# Patient Record
Sex: FEMALE | Race: WHITE
[De-identification: names, ages, dates, MRNs, and addresses within clinical notes are randomized per-mention and may not be internally consistent; named-entity substitution may affect disease eponyms.]

---

## 2020-11-25 ENCOUNTER — HOSPITAL ENCOUNTER (INPATIENT)
Dept: HOSPITAL 95 - ER | Age: 55
LOS: 4 days | Discharge: NURSING, REHAB OR LONG-TERM CARE | DRG: 897 | End: 2020-11-29
Attending: HOSPITALIST | Admitting: HOSPITALIST
Payer: COMMERCIAL

## 2020-11-25 VITALS — BODY MASS INDEX: 34.21 KG/M2 | HEIGHT: 67.99 IN | WEIGHT: 225.75 LBS

## 2020-11-25 DIAGNOSIS — I10: ICD-10-CM

## 2020-11-25 DIAGNOSIS — E66.9: ICD-10-CM
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
## 2020-11-25 DIAGNOSIS — E87.6: ICD-10-CM

## 2020-11-25 DIAGNOSIS — Z20.828: ICD-10-CM

## 2020-11-25 DIAGNOSIS — Z98.84: ICD-10-CM

## 2020-11-25 DIAGNOSIS — Z23: ICD-10-CM

## 2020-11-25 DIAGNOSIS — K22.10: ICD-10-CM

## 2020-11-25 DIAGNOSIS — D61.818: ICD-10-CM

## 2020-11-25 DIAGNOSIS — D62: ICD-10-CM

## 2020-11-25 DIAGNOSIS — D69.6: ICD-10-CM

## 2020-11-25 DIAGNOSIS — K63.5: ICD-10-CM

## 2020-11-25 DIAGNOSIS — F10.231: Primary | ICD-10-CM

## 2020-11-25 DIAGNOSIS — F32.9: ICD-10-CM

## 2020-11-25 DIAGNOSIS — K21.9: ICD-10-CM

## 2020-11-25 DIAGNOSIS — K70.30: ICD-10-CM

## 2020-11-25 LAB
ALBUMIN SERPL BCP-MCNC: 3 G/DL (ref 3.4–5)
ALBUMIN/GLOB SERPL: 0.8 {RATIO} (ref 0.8–1.8)
ALT SERPL W P-5'-P-CCNC: 48 U/L (ref 12–78)
ANION GAP SERPL CALCULATED.4IONS-SCNC: 8 MMOL/L (ref 6–16)
AST SERPL W P-5'-P-CCNC: 120 U/L (ref 12–37)
BASOPHILS # BLD AUTO: 0.04 K/MM3 (ref 0–0.23)
BASOPHILS NFR BLD AUTO: 1 % (ref 0–2)
BILIRUB SERPL-MCNC: 1.4 MG/DL (ref 0.1–1)
BUN SERPL-MCNC: 10 MG/DL (ref 8–24)
CALCIUM SERPL-MCNC: 8.5 MG/DL (ref 8.5–10.1)
CHLORIDE SERPL-SCNC: 109 MMOL/L (ref 98–108)
CO2 SERPL-SCNC: 26 MMOL/L (ref 21–32)
CREAT SERPL-MCNC: 0.7 MG/DL (ref 0.4–1)
DEPRECATED RDW RBC AUTO: 63.7 FL (ref 35.1–46.3)
EOSINOPHIL # BLD AUTO: 0.04 K/MM3 (ref 0–0.68)
EOSINOPHIL NFR BLD AUTO: 1 % (ref 0–6)
ERYTHROCYTE [DISTWIDTH] IN BLOOD BY AUTOMATED COUNT: 18.3 % (ref 11.7–14.2)
GLOBULIN SER CALC-MCNC: 4 G/DL (ref 2.2–4)
GLUCOSE SERPL-MCNC: 99 MG/DL (ref 70–99)
HCT VFR BLD AUTO: 28.5 % (ref 33–51)
HGB BLD-MCNC: 8.4 G/DL (ref 11.5–16)
IMM GRANULOCYTES # BLD AUTO: 0.02 K/MM3 (ref 0–0.1)
IMM GRANULOCYTES NFR BLD AUTO: 0 % (ref 0–1)
LYMPHOCYTES # BLD AUTO: 0.66 K/MM3 (ref 0.84–5.2)
LYMPHOCYTES NFR BLD AUTO: 14 % (ref 21–46)
MCHC RBC AUTO-ENTMCNC: 29.5 G/DL (ref 31.5–36.5)
MCV RBC AUTO: 96 FL (ref 80–100)
MONOCYTES # BLD AUTO: 0.39 K/MM3 (ref 0.16–1.47)
MONOCYTES NFR BLD AUTO: 8 % (ref 4–13)
NEUTROPHILS # BLD AUTO: 3.63 K/MM3 (ref 1.96–9.15)
NEUTROPHILS NFR BLD AUTO: 76 % (ref 41–73)
NRBC # BLD AUTO: 0 K/MM3 (ref 0–0.02)
NRBC BLD AUTO-RTO: 0 /100 WBC (ref 0–0.2)
PLATELET # BLD AUTO: 33 K/MM3 (ref 150–400)
POTASSIUM SERPL-SCNC: 3.9 MMOL/L (ref 3.5–5.5)
PROT SERPL-MCNC: 7 G/DL (ref 6.4–8.2)
PROTHROMBIN TIME: 11.4 SEC (ref 9.7–11.5)
SODIUM SERPL-SCNC: 143 MMOL/L (ref 136–145)

## 2020-11-25 PROCEDURE — C9113 INJ PANTOPRAZOLE SODIUM, VIA: HCPCS

## 2020-11-25 PROCEDURE — G0480 DRUG TEST DEF 1-7 CLASSES: HCPCS

## 2020-11-25 PROCEDURE — A9270 NON-COVERED ITEM OR SERVICE: HCPCS

## 2020-11-26 LAB
ALBUMIN SERPL BCP-MCNC: 2.6 G/DL (ref 3.4–5)
ALBUMIN/GLOB SERPL: 0.8 {RATIO} (ref 0.8–1.8)
ALT SERPL W P-5'-P-CCNC: 39 U/L (ref 12–78)
ANION GAP SERPL CALCULATED.4IONS-SCNC: 5 MMOL/L (ref 6–16)
AST SERPL W P-5'-P-CCNC: 98 U/L (ref 12–37)
BASOPHILS # BLD AUTO: 0.02 K/MM3 (ref 0–0.23)
BASOPHILS # BLD AUTO: 0.04 K/MM3 (ref 0–0.23)
BASOPHILS NFR BLD AUTO: 1 % (ref 0–2)
BASOPHILS NFR BLD AUTO: 1 % (ref 0–2)
BENZODIAZ UR-MCNC: DETECTED UG/L
BILIRUB SERPL-MCNC: 1.5 MG/DL (ref 0.1–1)
BUN SERPL-MCNC: 10 MG/DL (ref 8–24)
CALCIUM SERPL-MCNC: 7.9 MG/DL (ref 8.5–10.1)
CHLORIDE SERPL-SCNC: 109 MMOL/L (ref 98–108)
CO2 SERPL-SCNC: 28 MMOL/L (ref 21–32)
CREAT SERPL-MCNC: 0.62 MG/DL (ref 0.4–1)
DEPRECATED RDW RBC AUTO: 63.1 FL (ref 35.1–46.3)
DEPRECATED RDW RBC AUTO: 63.7 FL (ref 35.1–46.3)
EOSINOPHIL # BLD AUTO: 0.05 K/MM3 (ref 0–0.68)
EOSINOPHIL # BLD AUTO: 0.05 K/MM3 (ref 0–0.68)
EOSINOPHIL NFR BLD AUTO: 2 % (ref 0–6)
EOSINOPHIL NFR BLD AUTO: 2 % (ref 0–6)
ERYTHROCYTE [DISTWIDTH] IN BLOOD BY AUTOMATED COUNT: 18 % (ref 11.7–14.2)
ERYTHROCYTE [DISTWIDTH] IN BLOOD BY AUTOMATED COUNT: 18.3 % (ref 11.7–14.2)
FERRITIN SERPL-MCNC: 38 NG/ML (ref 8–252)
GLOBULIN SER CALC-MCNC: 3.4 G/DL (ref 2.2–4)
GLUCOSE SERPL-MCNC: 106 MG/DL (ref 70–99)
HCT VFR BLD AUTO: 24.4 % (ref 33–51)
HCT VFR BLD AUTO: 25.4 % (ref 33–51)
HGB BLD-MCNC: 7.2 G/DL (ref 11.5–16)
HGB BLD-MCNC: 7.6 G/DL (ref 11.5–16)
IMM GRANULOCYTES # BLD AUTO: 0 K/MM3 (ref 0–0.1)
IMM GRANULOCYTES # BLD AUTO: 0.01 K/MM3 (ref 0–0.1)
IMM GRANULOCYTES NFR BLD AUTO: 0 % (ref 0–1)
IMM GRANULOCYTES NFR BLD AUTO: 0 % (ref 0–1)
LYMPHOCYTES # BLD AUTO: 0.4 K/MM3 (ref 0.84–5.2)
LYMPHOCYTES # BLD AUTO: 0.81 K/MM3 (ref 0.84–5.2)
LYMPHOCYTES NFR BLD AUTO: 15 % (ref 21–46)
LYMPHOCYTES NFR BLD AUTO: 25 % (ref 21–46)
MCHC RBC AUTO-ENTMCNC: 29.5 G/DL (ref 31.5–36.5)
MCHC RBC AUTO-ENTMCNC: 29.9 G/DL (ref 31.5–36.5)
MCV RBC AUTO: 96 FL (ref 80–100)
MCV RBC AUTO: 96 FL (ref 80–100)
MONOCYTES # BLD AUTO: 0.24 K/MM3 (ref 0.16–1.47)
MONOCYTES # BLD AUTO: 0.31 K/MM3 (ref 0.16–1.47)
MONOCYTES NFR BLD AUTO: 10 % (ref 4–13)
MONOCYTES NFR BLD AUTO: 9 % (ref 4–13)
NEUTROPHILS # BLD AUTO: 2 K/MM3 (ref 1.96–9.15)
NEUTROPHILS # BLD AUTO: 2.03 K/MM3 (ref 1.96–9.15)
NEUTROPHILS NFR BLD AUTO: 62 % (ref 41–73)
NEUTROPHILS NFR BLD AUTO: 74 % (ref 41–73)
NRBC # BLD AUTO: 0 K/MM3 (ref 0–0.02)
NRBC # BLD AUTO: 0 K/MM3 (ref 0–0.02)
NRBC BLD AUTO-RTO: 0 /100 WBC (ref 0–0.2)
NRBC BLD AUTO-RTO: 0 /100 WBC (ref 0–0.2)
PLATELET # BLD AUTO: 29 K/MM3 (ref 150–400)
PLATELET # BLD AUTO: 29 K/MM3 (ref 150–400)
POTASSIUM SERPL-SCNC: 3.3 MMOL/L (ref 3.5–5.5)
PROT SERPL-MCNC: 6 G/DL (ref 6.4–8.2)
SODIUM SERPL-SCNC: 142 MMOL/L (ref 136–145)
TIBC SERPL-MCNC: 293 UG/DL (ref 250–450)

## 2020-11-26 NOTE — NUR
ADMIT
RECEIVED FROM ER VIA GURNEY.  ORIENTED TO SELF AND EVENT.  OCCASIONAL CONFUSED
CONVERSATION.  SLOW VERBAL RESPONSE NOTED.  REPOSITIONS SELF IN BED.  MOVES
ALL EXTREMITIES.  DENIES C/O PAIN OTHER THAN A MILD HEADACHE.  ALSO C/O
INTERMITTENT MILD NAUSEA.  IMPULSIVE BEHAVIOR NOTED.  BED ALARM ON.  MONITOR
SHOWS SR-ST, RATE 90s-100s.  2L NC.  RESPIRATIONS EVEN AND UNLABORED.  KANG
PATENT AND DRAINING LOU URINE.  BANANA BAG INFUSING AT 200CC/HR.  SEE ADMIT
ASSESSMENT FOR FULL ASSESSMENT.

## 2020-11-26 NOTE — NUR
ASSUMED CARE
PT A&O; SITTING IN BED; DENIES CHEST PAIN; VSS; HR 80'S; O2 SATS >93 ON
RA; POSEY VEST IN PLACE; KANG PATENT & DRAINING; CIWA 7; CALL LIGHT IN REACH;
BED IN LOWEST POSITION; BED ALARM ON.

## 2020-11-26 NOTE — NUR
CARE ASSUMED
ASSESSMENT COMPLETED. PT AWAKE BUT DROWSY UPON ASSESSMENT, WAS ABLE TO SIT UP
AND TAKE LIBRIUM WITHOUT DIFFICULTY. DENIES PAIN OR NAUSEA AT THIS TIME, IS
ORIENTED X4, IS ABLE TO RECALL EVENTS LEADING UP TO HOSPITALIZATION. PT
PLEASANT AND COOPERATIVE WITH CARE. CIWA SCORE 4, PRECEDEX 0.3MCG, WILL
ATTEMPT TO TITRATE DOWN AS ABLE. LS CLEAR, HRR, VSS. DR. ALLEN IN TO SEE
PATIENT, AWARE OF PLT AND HGB LEVELS, WILL RECHECK AS ORDERED.

## 2020-11-26 NOTE — NUR
TRANSFER NOTE
PT SLEPT MOST OF MORNING, VSS. DR. ALLEN AWARE OF LAB RESULTS, NEW ORDER FOR
GI CONSULT. PRECEDEX TITRATED OFF, PT WOKE UP AND ATE LUNCH, TOLERATED WELL.
DR. OH IN TO ASSESS, NEW ORDERS RECEIVED, NO PLANS FOR SCOPE AT THIS TIME,
PT ALLOWED TO EAT. PT UP TO BSC WITH MAX 2 PERSON ASSIST, HAD A LARGE BROWN
BM, NO OBVIOUS BLOOD IN STOOL. PT BACK TO BED FOR US, TOLERATED WELL, THEN SAT
UP AND ATE A SNACK, IS NOW RESTING IN BED WATCHING TV. PT REMAINS ORIENTED
BUT IMPULSIVE, AMADA REMAINS IN PLACE FOR PATIENT SAFETY. DAUGHTER UPDATED,
REPORTS PT HAS A HISTORY OF BLEEDING ULCERS WITH CAUTERIZATION IN 2017 AND
PARACENTESIS. REPORT TO BLADIMIR, PCU RN, PT TO PCU 1.

## 2020-11-26 NOTE — NUR
PT TRANSFERED FROM ICU TO PCU TODAY. NO ACUTE CHANGES. PT ALERT AND QUIET IN
ROOM, TOLERATING AMADA VEST WELL. PT ABLE TO ANSWER QUESTIONS APPROPRIATELY
AND MAKE HER NEEDS KNOWN. PT TAKES SCHEDULED DOSE OF LIBRIUM W/OUT DIFFICULTY,
DOES NOT REQUIRE PRN DOSE THIS AFTERNOON. REPORT HAS BEEN GIVEN TO VERONIQUE MEDINA TO
ASSUME CARE OF PT.

## 2020-11-26 NOTE — NUR
SHIFT SUMMARY
NO ACUTE CHANGES.  CIWA SCORE BETWEEN 9-12.  PRECEDEX NOW INFUSING @
0.3MCG/KG/HR.  PT CONTINUES TO BE IMPULSIVE AT TIMES, BUT IS REDIRECTABLE.
POSEY VEST IN PLACE.  TOOK SMALL SIPS OF WATER, BUT DOES
FALL ASLEEP WHILE TRYING TO DRINK.  VSS.  O2 2L NC.  CONTINUES WITH SNORING
RESPIRATIONS WHEN ASLEEP.  KANG PATENT AND DRAINING LOU URINE.  PAS TO
BLEs.  WILL REPORT TO ONCOMING RN WHEN AVAILABLE.

## 2020-11-27 LAB
ALBUMIN SERPL BCP-MCNC: 2.6 G/DL (ref 3.4–5)
ALBUMIN/GLOB SERPL: 0.8 {RATIO} (ref 0.8–1.8)
ALT SERPL W P-5'-P-CCNC: 34 U/L (ref 12–78)
ANION GAP SERPL CALCULATED.4IONS-SCNC: 5 MMOL/L (ref 6–16)
AST SERPL W P-5'-P-CCNC: 84 U/L (ref 12–37)
BASOPHILS # BLD AUTO: 0.03 K/MM3 (ref 0–0.23)
BASOPHILS NFR BLD AUTO: 1 % (ref 0–2)
BILIRUB SERPL-MCNC: 1.8 MG/DL (ref 0.1–1)
BUN SERPL-MCNC: 8 MG/DL (ref 8–24)
CALCIUM SERPL-MCNC: 7.8 MG/DL (ref 8.5–10.1)
CHLORIDE SERPL-SCNC: 109 MMOL/L (ref 98–108)
CO2 SERPL-SCNC: 27 MMOL/L (ref 21–32)
CREAT SERPL-MCNC: 0.55 MG/DL (ref 0.4–1)
DEPRECATED RDW RBC AUTO: 63.1 FL (ref 35.1–46.3)
EOSINOPHIL # BLD AUTO: 0.09 K/MM3 (ref 0–0.68)
EOSINOPHIL NFR BLD AUTO: 3 % (ref 0–6)
ERYTHROCYTE [DISTWIDTH] IN BLOOD BY AUTOMATED COUNT: 17.9 % (ref 11.7–14.2)
GLOBULIN SER CALC-MCNC: 3.3 G/DL (ref 2.2–4)
GLUCOSE SERPL-MCNC: 86 MG/DL (ref 70–99)
HCT VFR BLD AUTO: 25 % (ref 33–51)
HGB BLD-MCNC: 7.5 G/DL (ref 11.5–16)
IMM GRANULOCYTES # BLD AUTO: 0.01 K/MM3 (ref 0–0.1)
IMM GRANULOCYTES NFR BLD AUTO: 0 % (ref 0–1)
LYMPHOCYTES # BLD AUTO: 0.76 K/MM3 (ref 0.84–5.2)
LYMPHOCYTES NFR BLD AUTO: 24 % (ref 21–46)
MCHC RBC AUTO-ENTMCNC: 30 G/DL (ref 31.5–36.5)
MCV RBC AUTO: 96 FL (ref 80–100)
MONOCYTES # BLD AUTO: 0.34 K/MM3 (ref 0.16–1.47)
MONOCYTES NFR BLD AUTO: 11 % (ref 4–13)
NEUTROPHILS # BLD AUTO: 2 K/MM3 (ref 1.96–9.15)
NEUTROPHILS NFR BLD AUTO: 62 % (ref 41–73)
NRBC # BLD AUTO: 0.02 K/MM3 (ref 0–0.02)
NRBC BLD AUTO-RTO: 0.6 /100 WBC (ref 0–0.2)
PLATELET # BLD AUTO: 32 K/MM3 (ref 150–400)
POTASSIUM SERPL-SCNC: 3.3 MMOL/L (ref 3.5–5.5)
PROT SERPL-MCNC: 5.9 G/DL (ref 6.4–8.2)
SODIUM SERPL-SCNC: 141 MMOL/L (ref 136–145)

## 2020-11-27 PROCEDURE — 0DJ08ZZ INSPECTION OF UPPER INTESTINAL TRACT, VIA NATURAL OR ARTIFICIAL OPENING ENDOSCOPIC: ICD-10-PCS | Performed by: INTERNAL MEDICINE

## 2020-11-27 PROCEDURE — 3E0234Z INTRODUCTION OF SERUM, TOXOID AND VACCINE INTO MUSCLE, PERCUTANEOUS APPROACH: ICD-10-PCS | Performed by: NURSE PRACTITIONER

## 2020-11-27 NOTE — NUR
RETURN FROM OR
 
PT BACK FROM EGD. VITALS STABLE. DR. OH TO BEDSIDE TO DISCUSS FINDINGS. PT
TEARFUL, REQUESTING NOT TO WEAR POSEY ANY LONGER. DISCUSSED SAFETY/BEHAVIOR
RELATED TO THIS AND PT AGREES TO CALL FOR NEEDS. BED ALARM IN PLACE.

## 2020-11-27 NOTE — NUR
PROCEDURE
 
LOUISE FROM DAY SURGERY TO BEDSIDE TO TAKE PATIENT TO PROCEDURE. PT ABLE TO STAND
AND TRANSFER TO BED WITH TWO PERSON ASSIST. WEAK AND SHAKEY BUT STEADY.
TELEMETRY REMOVED, PCU MONITOR TECH NOTIFIED.

## 2020-11-27 NOTE — NUR
UPDATE
 
PT WORKED WITH PHYSICAL THERAPY, ABLE TO WALK AROUND UNIT WITH FWW AND GAIT
BELT AND ONE PERSON ASSIST. PT EXTREMELY IMPULSIVE, NOT FOLLOWING DIRECTIONS
WELL. PT CONTINUES TO BE ALERT AND ORIENTED X 4. PT TELLS PHYSICAL THERAPIST
THAT SHE PLANS TO LEAVE. WHEN DISCUSSING THIS WITH PATIENT, SHE SAYS SHE WANTS
TO LEAVE AGAINST MEDICAL ADVICE. DISCUSSED CONCERNS FOR SAFETY FOR HER AT HOME
AS SHE CONTINUES TO BE AN EXTREMELY HIGH FALL RISK. PT CONTINUES TO BE
INSISTENT ON GOING HOME. CALLING VARIOUS FAMILY MEMBERS LOOKING FOR A RIDE. PT
AGREES TO WAIT UNTIL WE CAN MAKE A SAFE PLAN FOR HER. SPOKE WITH DR. ALLEN
REGARDING PT'S REQUESTS. PER DR. ALLEN, SHE WILL BE TO BEDSIDE TO TALK WITH
PATIENT WHEN AVAILABLE. ATTEMPTED TO CALL CARE MANAGEMENT AND NOBODY AVAILABLE
AT THIS TIME. WHILE ON THE PHONE WITH DR. ALLEN, PT GETS UP OUT OF RECLINER,
NEARLY PULLING OUT IV, SETTING OFF BED ALARM AND STATES, "I JUST WANT TO GO
HOME." ABLE TO REDIRECT PATIENT BACK TO CHAIR. CNA'S TO BEDSIDE, ASSISTED BACK
TO BED. PT RESTING QUIETLY IN BED AT THIS TIME.

## 2020-11-27 NOTE — NUR
11/27/20 0808 Misha Nunes
PATIENT DETERMINED TO BE ASA APPROPRIATE FOR PROPOFOL SEDATION PRIOR
TO START OF PROCEDURE BY DR. OH
Bite Block Placed
3-LEAD EKG REVIEWED WITH PHYSICIAN PRIOR TO START OF PROCEDURE.
Patient to ENDO 1
MONITOR INTACT WITH CONTINUOUS PULSE OXIMETRY AND INTERMITTENT BP.
History, Chart, Medications and Allergies reviewed before start of
procedure.
O2 VIA N/C INTACT THROUGHOUT SEDATION/PROCEDURE.

## 2020-11-27 NOTE — NUR
UPDATE
 
DR. ALLEN TO BEDSIDE TO TALK WITH PATIENT. PT STATES HER DAUGHTER CONVINCED
HER TO STAY. CALM, COOPERATIVE AT THIS TIME. BED ALARM CONTINUES TO BE IN
PLACE.

## 2020-11-27 NOTE — NUR
SUMMARY
 
MEDICAL FLOOR NO TELEMETRY. PT CONTINUES TO BE EXTREMELY IMPULSIVE, SETTING
OFF THE BED ALARM MULTIPLE TIMES PER HOUR, BUT ABLE TO VERBALLY REDIRECT.
VITALS STABLE. SEE REPEAT ASSESSMENTS. PT CONTINUES TO REQUEST THINGS LIKE
TAKING IV OUT BUT IS AGREEABLE TO STAY OVERNIGHT AT THIS POINT. ETOH
WITHDRAWAL MANAGEABLE, SEE CIWA ASSESSMENTS. SEE EMAR FOR MEDICATION
ADMINISTRATION.

## 2020-11-27 NOTE — NUR
CIWA
 
PT'S ANXIETY AND TREMORS IMPROVING AND PT HAS BEEN CALM, COOPERATIVE AND
REDIRECTABLE. HOWEVER, WITHIN THE LAST 10 MINUTES PT HAS SET OFF BED ALARM
TWICE. AT MOST RECENT OCCURENCE, PT STATES, "I WAS JUST TRYING TO HELP THAT
GIRL AT THE DOOR, IT IS A CHILD LOCK." PT STANDING UP ON WINDOW SIDE OF BED,
PULLING AT IV. REDIRECTABLE BACK TO BED. ABLE TO STATE NAME, DAY, TIME AND
LOCATION. SEE REPEAT CIWA ASSESSMENT. ADDITIONAL DOSE OF LIBRIAM GIVEN PER
ORDERS.

## 2020-11-27 NOTE — NUR
UPDATE
 
SINCE RETURNING TO PCU, PT HAS SET OFF BED ALARM MULTIPLE TIMES BUT IS VERY
REDIRECTABLE. DR. TAYLOR TO BEDSIDE FOR ASSESSMENT. PLAN TO TRANSFER TO
MEDICAL FLOOR, DC TELEMETRY, DC PEARL, ADD PT/OT AS PT IS WEAK. PT ABLE TO
STAND UP AND TAKE A FEW STEPS, BUT QUICKLY LOSES HER BALANCE AND FALLS
BACKWARDS. ABLE TO GUIDE BACK INTO BED. CONTINUE WITH TWO PERSON ASSIST AND
BED ALARM. PT ORIENTED X 4 BUT FORGETFUL AND APPEARS TO NOT RETAIN EDUCATION
LONG TERM. PT ALSO HAS DIFFERING STORY, TELLING THE MEDICAL RESIDENT THAT SHE
WANTS TO GO TO [facility] AFTER SHE DISCHARGES AND TELLING DR. TAYLOR AND ROMA
DISCHARGE PLANNER THAT SHE IS GOING TO GO HOME TO [place] FOR 14 DAYS AND
[place] AND GET STRONGER BEFORE RETURNING TO [facility]. DR. TAYLOR AWARE.

## 2020-11-27 NOTE — NUR
. BROUGHT TO [facility] ADMISSION PRE PROCEDURE STARTED
Patient confirms NPO status and agrees with scheduled surgery.
History, Chart, Medications and Allergies reviewed before start of
procedure.

## 2020-11-27 NOTE — NUR
SHIFT SUMMARY
PT A&O X 4; MAKES NEEDS KNOWN; POSEY VEST IN PLACE; VSS; DENIES CHEST PAIN;
NSR NOTED ON TELE W/ HR 80'S; O2 SATS > 93 ON RA; DENIES SOB; KANG PATENT &
DRAINING; PT SLEPT A COUPLE HOURS; LIBRIUM Q6; CIWA 7/8; CALL LIGHT IN REACH;
BED IN LOWEST POSITION; BED ALARM ON; PT RESTING IN BED; NO DISTRESS NOTED;
WILL CONTINUE TO MONITOR CLOSELY UNTIL HAND OFF TO DAY SHIFT RN.

## 2020-11-28 LAB
ALBUMIN SERPL BCP-MCNC: 2.9 G/DL (ref 3.4–5)
ALBUMIN/GLOB SERPL: 0.8 {RATIO} (ref 0.8–1.8)
ALT SERPL W P-5'-P-CCNC: 34 U/L (ref 12–78)
ANION GAP SERPL CALCULATED.4IONS-SCNC: 7 MMOL/L (ref 6–16)
AST SERPL W P-5'-P-CCNC: 73 U/L (ref 12–37)
BASOPHILS # BLD AUTO: 0.02 K/MM3 (ref 0–0.23)
BASOPHILS NFR BLD AUTO: 1 % (ref 0–2)
BILIRUB SERPL-MCNC: 1.6 MG/DL (ref 0.1–1)
BUN SERPL-MCNC: 6 MG/DL (ref 8–24)
CALCIUM SERPL-MCNC: 8.1 MG/DL (ref 8.5–10.1)
CHLORIDE SERPL-SCNC: 110 MMOL/L (ref 98–108)
CO2 SERPL-SCNC: 25 MMOL/L (ref 21–32)
CREAT SERPL-MCNC: 0.61 MG/DL (ref 0.4–1)
DEPRECATED RDW RBC AUTO: 64.7 FL (ref 35.1–46.3)
EOSINOPHIL # BLD AUTO: 0.1 K/MM3 (ref 0–0.68)
EOSINOPHIL NFR BLD AUTO: 4 % (ref 0–6)
ERYTHROCYTE [DISTWIDTH] IN BLOOD BY AUTOMATED COUNT: 18.2 % (ref 11.7–14.2)
GLOBULIN SER CALC-MCNC: 3.7 G/DL (ref 2.2–4)
GLUCOSE SERPL-MCNC: 87 MG/DL (ref 70–99)
HCT VFR BLD AUTO: 27 % (ref 33–51)
HGB BLD-MCNC: 7.9 G/DL (ref 11.5–16)
IMM GRANULOCYTES # BLD AUTO: 0 K/MM3 (ref 0–0.1)
IMM GRANULOCYTES NFR BLD AUTO: 0 % (ref 0–1)
LYMPHOCYTES # BLD AUTO: 0.66 K/MM3 (ref 0.84–5.2)
LYMPHOCYTES NFR BLD AUTO: 23 % (ref 21–46)
MCHC RBC AUTO-ENTMCNC: 29.3 G/DL (ref 31.5–36.5)
MCV RBC AUTO: 98 FL (ref 80–100)
MONOCYTES # BLD AUTO: 0.37 K/MM3 (ref 0.16–1.47)
MONOCYTES NFR BLD AUTO: 13 % (ref 4–13)
NEUTROPHILS # BLD AUTO: 1.73 K/MM3 (ref 1.96–9.15)
NEUTROPHILS NFR BLD AUTO: 60 % (ref 41–73)
NRBC # BLD AUTO: 0 K/MM3 (ref 0–0.02)
NRBC BLD AUTO-RTO: 0 /100 WBC (ref 0–0.2)
PLATELET # BLD AUTO: 47 K/MM3 (ref 150–400)
POTASSIUM SERPL-SCNC: 3.7 MMOL/L (ref 3.5–5.5)
PROT SERPL-MCNC: 6.6 G/DL (ref 6.4–8.2)
SODIUM SERPL-SCNC: 142 MMOL/L (ref 136–145)

## 2020-11-28 NOTE — NUR
SHIFT SUMMARY
PT A&O X3; VERY NEEDY AND IMPULSIVE THIS SHIFT; CIWA 12-14; SCHEDULED LIBRIUM
AND ADDITIONAL LIBRIUM GIVEN PER EMAR; ATIVAN GIVEN 1X THIS SHIFT; THIS RN
SPENT SIGNIFICANT TIME IN ROOM SITTING W/ PT ALLOWING TIME TO EXPRESS
FEELINGS; STATES SHE KNOWS SHE NEEDS TO GO BACK TO [facility] TO FINISH TREATMENT;
PT STATES HER FAMILY IS INSISTING SHE RECEIVES TREATMENT FOR ALCOHOL
DEPENDENCY; CNA WALKED PT ABOUT ROOM W/ FWW AS REQUESTED; UP TO CHAIR AND BACK
TO BED FREQUENTLY; SETTING BED ALARM OFF EVERY FEW MINUTES; VSS; NO TELE IN
PLACE; O2 SATS >93 ON RA; CALL LIGHT IN REACH; BED IN LOWEST POSITION; BED
ALARM ON; WILL CONTINUE TO MONITOR CLOSELY UNTIL HAND OFF TO DAY SHIFT RN.

## 2020-11-28 NOTE — NUR
SPOKE WITH MATIAS SEVERAL TIMES TODAY REGARDING PATIENT DISCHARGE TO [facility].
[facility] STATED THAT THEY ARE UNABLE TO FILL PATIENT'S MEDICATION LIST UNTIL
MONDAY, THEY USE A DELIVERY SERVICE. THEY USUALLY HAVE PATIENTS BRING A HOME
MEDICATION SUPPLY FOR 7 DAYS WHEN THEY ARE ADMITTED. THEY DO NOT HAVE ANY
MEDICATIONS FOR HER AT [facility] EVEN THOUGH THE PATIENT WAS THERE PREVIOUSLY.
 
CALLED DR. TAYLOR AND DISCUSSED PLAN WITH HIM. PATIENT UNABLE TO GET HER MED
LIST MEDICATIONS WHILE AT [facility] UNTIL MONDAY. SHE IS STILL A LITTLE CONFUSED,
CIWA WAS UP TO 13 AND NOW IS DOWN TO 6. SHE WOULD BE UNSAFE TO GET ON A TAXI,
GO TO A PHARMACY AND  HER PRESCRIPTIONS AND THEN GET A TAXI TO GO TO
[facility]. PATIENT IS FROM [place] AND DOES NOT HAVE ANYONE IN TOWN WHO WOULD BE
ABLE TO  HER PRESCRIPTIONS AND TAKE THEM TO [facility] FOR HER. DR. TAYLOR
ORDERED TO DC DISCHARGE AT THIS TIME.
 
MERRICK ALBARRAN HAD WALKER DELIVERED FROM [facility] TO PATIENT ROOM. [facility]
REQUIRES PATIENT TO HAVE WALKER IF PHYSICAL THERAPY RECOMMENDED IT.

## 2020-11-28 NOTE — NUR
DR. TAYLOR AT BEDSIDE. DISCUSSED POC. NOTIFIED HIM THAT ACCORDING TO REPORT
PATIENT WAS UP OUT OF BED AND SET OFF THE BED ALARM MULTIPLE TIMES LAST NIGHT.
PATIENT WAS SLIGHTLY CONFUSED. PATIENT IS ABLE TO ANSWER DR. TAYLOR'S
QUESTIONS APPROPRIATELY. CALM AT THIS TIME. DR. TAYLOR STATES PLAN IS TO
DISCHARGE BACK TO [facility] TODAY. REQUESTED THAT I CALL PHYSICAL THERAPY TO
REEVALUATE PATIENT AND CALL [facility] TO SEE IF SHE IS ABLE TO RETURN AND THEN
NOTIFY HIM SO HE CAN PROCEED WITH DISCHARGE.

## 2020-11-28 NOTE — NUR
AT 1420, PATIENT UP OUT OF BED. CONFUSED TO PLACE. DID NOT KNOW SHE WAS IN THE
HOSPITAL OR IN [place]. REPORTING HEADACHE PAIN RATED 8/10. CIWA 13.
ADMINISTERED LIBRIUM PER EMAR. CIWA NOW 6. PATIENT ANSWERING QUESTIONS
APPROPRIATELY BUT STILL MAKES COMMENTS THAT MAKE HER SEEM CONFUSED.

## 2020-11-28 NOTE — NUR
SHIFT SUMMARY:
PATIENT A/OX3, CAN ANSWER QUESTIONS APPROPRIATELY BUT GETS CONFUSED AT TIMES
AND HAS TO BE REORIENTED. CIWA WAS UP TO 13, LIBRIUM GIVEN, CIWA DOWN TO 6.
LIBRIUM ONLY GIVEN X1 THIS SHIFT. HAS DENIED PAIN. HAS SET OFF BED ALARM
MULTIPLE TIMES THIS SHIFT. ASSESSED BY PHYSICAL THERAPY WHO RECOMMENDED A
WALKER IF PATIENT GOES BACK TO [facility], [facility] SAID PATIENT NEEDS TO BRING HER
OWN WALKER, [facility] DELIVERED WALKER TO PATIENT, IT IS IN HER ROOM. UP WITH
SBA. SEE PREVIOUS NOTE ON [facility]. PLAN IS TO DISCHARGE BACK TO [facility] WHEN
APPROPRIATE. REPORT GIVEN TO ONCOMING RN.

## 2020-11-28 NOTE — NUR
PATIENT WANTING TO GO HOME, STATES "I DON'T UNDERSTAND WHY I CAN'T LEAVE".
EXPLAINED TO HER THE REASONS WHY SHE COULD NOT BE DISCHARGED TODAY, THAT WE
TRIED TO DISCHARGE TO [facility] BUT IT WAS UNSAFE FOR HER TO GO TO THE PHARMACY,
GET HER MEDS, AND THEN GET A TAXI BACK TO [facility]. SHE SAID "IT'S NOT FAIR THAT
YOU GUYS KEEP ME HERE FOREVER! I'VE BEEN HERE TWO WEEKS!" REORIENTED PATIENT.
SHE SAID "LET ME GET A TAXI TO MY HOUSE". NOTIFIED HER THAT SHE WAS IN
[place] AND NOT IN [place] WHERE SHE LIVES. PATIENT AGREEABLE TO STAY IN THE
HOSPITAL AT THIS TIME.

## 2020-11-29 NOTE — NUR
Spoke with detox staff at [facility]; states pt can be admitted to their
facility today if med rec is faxed to them and if the pt has walker and able
to ambulate herself to the bathroom.

## 2020-11-29 NOTE — NUR
RECEIVED REPORT FROM VERONIQUE PERALTA. PT IS RESTING IN BED, LYING ON RIGHT SIDE WITH
EYES CLOSED, EVEN AND UNLABORED RESPIRATIONS.

## 2020-11-29 NOTE — NUR
Attempted to contact [facility] 
regarding pt's discharge today to their program.  Left message on Claudia's
voicemail, [phone], option "2".

## 2020-11-29 NOTE — NUR
SHIFT SUMMARY
 
PT WAS CONFUSED T/O THE NIGHT, NOT KNOWING WHERE SHE WAS AND TALKING TO ME AS
IF I WERE A FAMILY MEMBER. PT WAS PLEASENT AND COOPERATIVE WITH CARE BUT WOULD
GET VERY CONCERNED ABOUT HER BELONGINGS AND CRY THINKING THEY WERE STOLEN. PT
HAD VERY LOW CIWA'S OF 2 T/O THE SHIFT. NO PRN MEDICATION WAS GIVEN. PT STATED
NO PAIN AND WAS ABLE TO SLEEP. VITALS WERE STABLE. PT HAD AN UNEVENTFUL NIGHT.
WILL CONTINUE TO MONITOR UNTIL SHIFT CHANGE.

## 2020-12-22 ENCOUNTER — HOSPITAL ENCOUNTER (EMERGENCY)
Dept: HOSPITAL 95 - ER | Age: 55
Discharge: HOME | End: 2020-12-22
Payer: COMMERCIAL

## 2020-12-22 VITALS — HEIGHT: 67 IN | WEIGHT: 202.01 LBS | BODY MASS INDEX: 31.71 KG/M2

## 2020-12-22 DIAGNOSIS — Z79.899: ICD-10-CM

## 2020-12-22 DIAGNOSIS — I10: ICD-10-CM

## 2020-12-22 DIAGNOSIS — R60.0: Primary | ICD-10-CM

## 2020-12-22 LAB
ALBUMIN SERPL BCP-MCNC: 2.3 G/DL (ref 3.4–5)
ALBUMIN/GLOB SERPL: 0.5 {RATIO} (ref 0.8–1.8)
ALT SERPL W P-5'-P-CCNC: 14 U/L (ref 12–78)
ANION GAP SERPL CALCULATED.4IONS-SCNC: 7 MMOL/L (ref 6–16)
AST SERPL W P-5'-P-CCNC: 39 U/L (ref 12–37)
BASOPHILS # BLD AUTO: 0.07 K/MM3 (ref 0–0.23)
BASOPHILS NFR BLD AUTO: 1 % (ref 0–2)
BILIRUB SERPL-MCNC: 0.5 MG/DL (ref 0.1–1)
BUN SERPL-MCNC: 8 MG/DL (ref 8–24)
CALCIUM SERPL-MCNC: 8.5 MG/DL (ref 8.5–10.1)
CHLORIDE SERPL-SCNC: 107 MMOL/L (ref 98–108)
CO2 SERPL-SCNC: 21 MMOL/L (ref 21–32)
CREAT SERPL-MCNC: 0.63 MG/DL (ref 0.4–1)
DEPRECATED RDW RBC AUTO: 57.5 FL (ref 35.1–46.3)
EOSINOPHIL # BLD AUTO: 0.22 K/MM3 (ref 0–0.68)
EOSINOPHIL NFR BLD AUTO: 3 % (ref 0–6)
ERYTHROCYTE [DISTWIDTH] IN BLOOD BY AUTOMATED COUNT: 17.6 % (ref 11.7–14.2)
GLOBULIN SER CALC-MCNC: 4.4 G/DL (ref 2.2–4)
GLUCOSE SERPL-MCNC: 87 MG/DL (ref 70–99)
HCT VFR BLD AUTO: 30.4 % (ref 33–51)
HGB BLD-MCNC: 9 G/DL (ref 11.5–16)
IMM GRANULOCYTES # BLD AUTO: 0.01 K/MM3 (ref 0–0.1)
IMM GRANULOCYTES NFR BLD AUTO: 0 % (ref 0–1)
LYMPHOCYTES # BLD AUTO: 1.18 K/MM3 (ref 0.84–5.2)
LYMPHOCYTES NFR BLD AUTO: 18 % (ref 21–46)
MCHC RBC AUTO-ENTMCNC: 29.6 G/DL (ref 31.5–36.5)
MCV RBC AUTO: 89 FL (ref 80–100)
MONOCYTES # BLD AUTO: 0.48 K/MM3 (ref 0.16–1.47)
MONOCYTES NFR BLD AUTO: 8 % (ref 4–13)
NEUTROPHILS # BLD AUTO: 4.48 K/MM3 (ref 1.96–9.15)
NEUTROPHILS NFR BLD AUTO: 70 % (ref 41–73)
NRBC # BLD AUTO: 0 K/MM3 (ref 0–0.02)
NRBC BLD AUTO-RTO: 0 /100 WBC (ref 0–0.2)
PLATELET # BLD AUTO: 275 K/MM3 (ref 150–400)
POTASSIUM SERPL-SCNC: 5 MMOL/L (ref 3.5–5.5)
PROT SERPL-MCNC: 6.7 G/DL (ref 6.4–8.2)
SODIUM SERPL-SCNC: 135 MMOL/L (ref 136–145)
TROPONIN I SERPL-MCNC: <0.015 NG/ML (ref 0–0.04)